# Patient Record
Sex: FEMALE | Race: WHITE | HISPANIC OR LATINO | Employment: UNEMPLOYED | URBAN - METROPOLITAN AREA
[De-identification: names, ages, dates, MRNs, and addresses within clinical notes are randomized per-mention and may not be internally consistent; named-entity substitution may affect disease eponyms.]

---

## 2017-03-08 ENCOUNTER — TELEPHONE (OUTPATIENT)
Dept: OBSTETRICS AND GYNECOLOGY | Facility: CLINIC | Age: 27
End: 2017-03-08

## 2017-03-08 NOTE — TELEPHONE ENCOUNTER
----- Message from Alex Helm sent at 3/7/2017  2:11 PM CST -----  Contact: Delfina webb/[facility] [phone]    She states she need Dr. Correa or the nurse e-mail address to send the return label for the MIRENA.  Please advise